# Patient Record
Sex: FEMALE | Race: WHITE | Employment: OTHER | URBAN - METROPOLITAN AREA
[De-identification: names, ages, dates, MRNs, and addresses within clinical notes are randomized per-mention and may not be internally consistent; named-entity substitution may affect disease eponyms.]

---

## 2021-01-15 ENCOUNTER — OFFICE VISIT (OUTPATIENT)
Dept: NEUROLOGY | Age: 46
End: 2021-01-15

## 2021-01-15 ENCOUNTER — DOCUMENTATION ONLY (OUTPATIENT)
Dept: NEUROLOGY | Age: 46
End: 2021-01-15

## 2021-01-15 VITALS
HEART RATE: 92 BPM | WEIGHT: 275 LBS | OXYGEN SATURATION: 97 % | RESPIRATION RATE: 18 BRPM | DIASTOLIC BLOOD PRESSURE: 100 MMHG | HEIGHT: 62 IN | SYSTOLIC BLOOD PRESSURE: 152 MMHG | TEMPERATURE: 97 F | BODY MASS INDEX: 50.61 KG/M2

## 2021-01-15 DIAGNOSIS — R20.9 SENSORY DISTURBANCE: ICD-10-CM

## 2021-01-15 DIAGNOSIS — R42 DIZZY: Primary | ICD-10-CM

## 2021-01-15 DIAGNOSIS — R29.818 TRANSIENT NEUROLOGICAL SYMPTOMS: ICD-10-CM

## 2021-01-15 DIAGNOSIS — R29.818 TRANSIENT NEUROLOGICAL SYMPTOMS: Primary | ICD-10-CM

## 2021-01-15 DIAGNOSIS — M79.605 LEFT LEG PAIN: ICD-10-CM

## 2021-01-15 PROCEDURE — 99204 OFFICE O/P NEW MOD 45 MIN: CPT | Performed by: PSYCHIATRY & NEUROLOGY

## 2021-01-15 RX ORDER — LORATADINE 10 MG/1
10 TABLET ORAL
COMMUNITY

## 2021-01-15 RX ORDER — LEVOTHYROXINE SODIUM 25 UG/1
TABLET ORAL
COMMUNITY
Start: 2020-12-13

## 2021-01-15 RX ORDER — SERTRALINE HYDROCHLORIDE 50 MG/1
TABLET, FILM COATED ORAL
COMMUNITY
Start: 2020-12-16

## 2021-01-15 NOTE — LETTER
1/15/2021 Patient: Judy Cee YOB: 1975 Date of Visit: 1/15/2021 Sanjana Lomax MD 
[address] [facility] [address] [place] [address] [phone] Via Fax: [phone] Dear Sanjana Lomax MD, Thank you for referring Ms. Agarwal Mail to [facility] for evaluation. My notes for this consultation are attached. If you have questions, please do not hesitate to call me. I look forward to following your patient along with you. Sincerely, Kassandra Saxena MD

## 2021-01-15 NOTE — PROGRESS NOTES
Chief Complaint   Patient presents with    New Patient    Leg Pain     left, pain is intermittent. has constant vibration feeling in back of leg      Pain when standing/walking for extended periods    Main sx in head is migrainous but not quite. Like a wave crashing in head.   All correlates with feeling in leg    Has only seen ENT because she thought it was vertigo sx

## 2021-01-15 NOTE — PROGRESS NOTES
[facility] AT [place]   Carotid Doppler Report    Date of Service: 1/15/21  Referring: Dr. Roberta Morris      B-mode imaging reveals mild intimal thickening at the bifurcation extending into the internal carotid artery segments bilaterally. Doppler spectral analysis reveals no elevated velocities. Vertebral artery flow antegrade bilaterally.     Interpretation  1-15% bilateral ICA    Jigna Pink MD

## 2021-01-15 NOTE — PROGRESS NOTES
[facility] and [address] at [facility] at [address] [place] [address], [address] [facility] [address] [address] [facility], [address] [place] [address]  [phone] Office  [phone] Facsimile           Referring: Leonard Jean MD      Chief Complaint   Patient presents with    New Patient    Leg Pain     left, pain is intermittent. has constant vibration feeling in back of leg      79-year-old right-handed lady who comes today for evaluation of what she calls suspected nerve issue. Her symptoms began at the end of September. She does note that like everyone its been in intense 2020 although she did lose her brother at the age of 29 unexpectedly and her grandmother passed away as well after having had dementia. In any regard she says she has a collection of symptoms that she believes are related. She started to have what she calls a vibration in the left lower extremity and some pain behind the knee. She has this rumbling feeling that is constant behind the knee and vibratory. Worsens if she bends her leg. She has a history of migraine and has maybe 4/year. She notes that if she bends her leg she will have increased pain in her neck and then has this wave go through her head and a crushing type sensation. She notes this crushing sensation then builds throughout her body. She has dizziness described as imbalance. No alejandra spinning. Symptoms increased when she lays down. No focal weakness. Her symptoms would awaken her from sleep and she wonders if she is bending her left leg when she sleeps. She did see ENT x2 and did not have any otolaryngology abnormality and there was not felt to have benign positional vertigo etc.  She does describe a Killeen-Hallpike type maneuver. She has not had any imaging.   She does note that she has had a visual disturbance in the past that was felt to perhaps be ocular migraine where she would see robert's. No injury. At the start of this she was having a lot of panic attacks and [facility]  She was diagnosed with hypothyroidism and she was started on levothyroxine. Her thyroid levels have since come back into the normal range. She was also having panic attack and started on Zoloft. Chart review finds no documentation in our system. No recent imaging in our system. No neurologic imaging in our system. Past Medical History:   Diagnosis Date    Anxiety     Hypothyroidism        Past Surgical History:   Procedure Laterality Date    HX CHOLECYSTECTOMY      HX WISDOM TEETH EXTRACTION              Not on File    Social History     Tobacco Use    Smoking status: Former Smoker    Smokeless tobacco: Never Used   Substance Use Topics    Alcohol use: Not Currently    Drug use: Not Currently       Family History   Problem Relation Age of Onset   Medina Migraines Mother     Hypertension Mother     No Known Problems Father     Dementia Maternal Grandmother        Review of Systems  Pertinent positives and negatives as noted with remainder of comprehensive review negative      Examination  Visit Vitals  Temp 97 °F (36.1 °C)     Neurologically, she is awake, alert, and oriented with normal speech and language. Her cognition is normal.    Intact cranial nerves 2-12. No nystagmus. Visual fields full to confrontation. Disk margins are flat bilaterally. She has normal bulk and tone. She has no abnormal movement. She has no pronation or drift. She generates full strength in the upper and lower extremities to direct confrontational testing. Reflexes are symmetrical in the upper and lower extremities bilaterally. No pathologic reflexes are elicited. .  Finger nose finger and rapid alternating movements are normal.  Steady gait. No sensory deficit to primary modalities. Dress and grooming are appropriate. No scleral icterus is present. Oropharynx is clear.   Supple neck without bruit appreciated. Heart regular. Pulses are symmetrical.  No edema in the lower extremities. Impression/Plan  Left lower extremity sensory disturbance and pain  Dizziness  Visual disturbance  Abnormal sensations    Differential diagnosis certainly includes demyelinating disease versus space-occupying versus vascular versus supratentorial versus other  MRI of the brain  EEG  Carotid Doppler  EMG of the left lower extremity  Evoked potentials    Follow-up after testing    Pearson Peabody, MD        This note was created using voice recognition software. Despite editing, there may be syntax errors.

## 2021-01-15 NOTE — PATIENT INSTRUCTIONS
RESULT POLICY If we have ordered testing for you, know that; \"NO NEWS IS GOOD NEWS! \" It is our policy that we no longer call patients with results, nor do we  give test results over the phone. We schedule follow up appointments so that your results can be discussed in person. This allows you to address any questions you have regarding the results. If you choose to go to an imaging center outside of [facility], it is your responsibility to bring imaging report and disc to follow up appointment. If something of concern is revealed on your test, we will contact you to discuss the matter and if needed schedule a sooner follow up appointment. Additionally, results may be found by using the My Chart feature and one of our patient service representatives at the  can give you instructions on how to access this feature to utilize our electronic medical record system. Thank you for your understanding. PRESCRIPTION REFILL POLICY [facility] Statement to Patients April 1, 2014 In an effort to ensure the large volume of patient prescription refills is processed in the most efficient and expeditious manner, we are asking our patients to assist us by calling your Pharmacy for all prescription refills, this will include also your  Mail Order Pharmacy. The pharmacy will contact our office electronically to continue the refill process. Please do not wait until the last minute to call your pharmacy. We need at least 48 hours (2days) to fill prescriptions. We also encourage you to call your pharmacy before going to  your prescription to make sure it is ready. With regard to controlled substance prescription refill requests (narcotic refills) that need to be picked up at our office, we ask your cooperation by providing us with at least 72 hours (3days) notice that you will need a refill. We will not refill narcotic prescription refill requests after 4:00pm on any weekday, Monday through Thursday, or after 2:00pm on Fridays, or on the weekends. We encourage everyone to explore another way of getting your prescription refill request processed using [employer], our patient web portal through our electronic medical record system. [employer]ay is an efficient and effective way to communicate your medication request directly to the office and  downloadable as an schuyler on your smart phone . [employer] also features a review functionality that allows you to view your medication list as well as leave messages for your physician. Are you ready to get connected? If so please review the attatched instructions or speak to any of our staff to get you set up right away! Thank you so much for your cooperation. Should you have any questions please contact our Practice Administrator.

## 2021-01-15 NOTE — PROCEDURES
EEG:      Date:  01/15/21    Requesting Physician:  Bruno Ralph. MD [place]    An EEG is requested in this 41year old lady with transient neurologic symptoms to evaluate for epileptiform abnormality. Medications:  Medications are listed as Synthroid and Zoloft. This tracing is obtained during the awake state. During wakefulness there are intermittent runs of posteriorly-dominant and symmetric low-to-medium amplitude 11-11.5 cycle per second activities which attenuate with eye opening. Lower-voltage faster-frequency activities are seen symmetrically over the anterior head regions. Hyperventilation is not performed secondary to COVID-19 precautions. Intermittent photic stimulation little alters the tracing. Sleep is not attained. Interpretation:   This EEG recorded during the awake state is normal.

## 2021-02-02 ENCOUNTER — OFFICE VISIT (OUTPATIENT)
Dept: NEUROLOGY | Age: 46
End: 2021-02-02

## 2021-02-02 VITALS — TEMPERATURE: 97.2 F

## 2021-02-02 DIAGNOSIS — M54.17 LUMBOSACRAL RADICULOPATHY: ICD-10-CM

## 2021-02-02 DIAGNOSIS — R29.818 TRANSIENT NEUROLOGICAL SYMPTOMS: Primary | ICD-10-CM

## 2021-02-02 DIAGNOSIS — G62.9 NERVE DISEASE, PERIPHERAL: Primary | ICD-10-CM

## 2021-02-02 DIAGNOSIS — R42 DIZZY: Primary | ICD-10-CM

## 2021-02-02 NOTE — PROCEDURES
[facility] AT [place]   Brainstem Auditory Evoked Potential Report    Date of Service: 2/2/21  Referring: Dr. Trista Mazariegos  Indication: Dizzy    Bilateral brainstem auditory evoked potential evoked potential is performed. Waveforms are appropriate for interpretation. Absolute latency of the waveforms are  normal bilaterally within technical limitations.     Normal Study    Oscar Jesus MD

## 2021-02-02 NOTE — PROGRESS NOTES
This was an elective EMG and nerve conduction of the patient's left lower extremity by request.  Concerns went to an acquired peripheral neuropathy versus lumbosacral radiculopathy. Patient history. Left lower extremity sensory disturbance and pain. Patient said she would get infrequent lower back discomfort. Patient exam.  Alert cooperative articulate and appropriate. Cranial nerves II through XII normal.  Cerebellar testing finger-nose-finger toe finger tap. Motor 5/5. Sensory intact to touch temperature and vibratory. Reflexes approaching +2 gait and transfers normal.     EMG and nerve conduction findings. 1.  Needle insertion and probing was uniformly normal.  There was no evidence of acute denervation chronic denervation/reinnervation or myopathic potentials. Patient tolerated this procedure well although some muscles were more painful than others. Motor unit recruitment as to number morphology and time sequencing all appropriate. The investigation included upper through lower left-sided lumbosacral paraspinals and unrevealing. 2.  The nerve conduction study was normal except for the nerve conduction velocity discrepancy for the left peroneal nerve at the fibular head. However downstream response in the form of amplitude and morphology was maintained. The coincident tibial F wave and H reflex were normal.    Impression: This is a normal EMG and nerve conduction including paraspinals. The study does not rule out the small fiber sensory neuropathy.   ANUP VILLEGAS.

## 2021-02-02 NOTE — PROCEDURES
[facility] AT [place]   Visual Evoked Potential Report    Date of Service: 2/2/21  Referring: Dr. Corinne Holman  Indication: Transient neurologic symptoms    Bilateral full field pattern reversal visual evoked potential is performed. Waveforms are appropriate for interpretation. Absolute latency of the P100 is normal bilaterally.     Normal Study    Susan Ivy MD

## 2021-02-02 NOTE — PROGRESS NOTES
EMG/ NCS Report  [facility] - INPATIENT  [address] [facility] [address] [place], [address]   Ph: [phone]   FAX: [phone]/ [phone]  Test Date:  2021    Patient: Zohra Angulo : 1975 Physician: Selma Scott   Sex: Female Height:  cm Ref Phys: Jack Villar MD   ID#: [id number] Weight:  lbs. Technician: Reema Burton     Patient Complaints:  CC:DIZZY    Medications      Patient History / Exam:      EMG & NCV Findings:  Impression:      Recommendations:          ___________________________  Selma Scott        VEP     Trace N75 (ms) P100 (ms) N145 (ms) U20-C985 (µV)   Norm  <117     *Ch2 : 01-CZ : L 69.9 94.9 130.1 6.78   *Ch2 : 02-CZ : R 70.3 94.9 136.7 5.20   L-R Norm  <7     L-R 0.4 0.0 6.6 1.58   *A 88 millisecond delay was used for the monitor.            Waveforms:

## 2021-02-02 NOTE — PROGRESS NOTES
EMG/ NCS Report  [facility] - INPATIENT  [address] [place], [address]   Ph: [phone]   FAX: [phone]/ [phone]  Test Date:  2019      Test Date:  2021    Patient: Cecilia Lott : 1975 Physician: Angel Maldonado MD   Sex: Female Height: ' \" Ref Phys: Joe Goldsmith IV, MD   ID#: [id number] Weight:  lbs. Technician: Esau Centeno     Patient History / Exam:  CC:LT. LEG PAIN WITH FEELING OF SENSRY ISSUES. SYMPTOMS SINCE SEPT. EMG & NCV Findings:  Evaluation of the left Fibular motor nerve showed normal distal onset latency (3.8 ms), normal amplitude (6.3 mV), normal conduction velocity (B Fib-Ankle, 47 m/s), and normal conduction velocity (Poplt-B Fib, 63 m/s). The left tibial motor nerve showed normal distal onset latency (3.8 ms), normal amplitude (10.5 mV), and normal conduction velocity (Knee-Ankle, 50 m/s). The left Sup Fibular sensory nerve showed normal distal peak latency (2.4 ms), normal amplitude (18.0 µV), and normal conduction velocity (Lower leg-Lat ankle, 56 m/s). The left sural sensory nerve showed normal distal peak latency (3.3 ms) and normal amplitude (9.3 µV). All F Wave latencies were within normal limits. All examined muscles (as indicated in the following table) showed no evidence of electrical instability.         Impression:        ___________________________  Joe Goldsmith IV, MD      Nerve Conduction Studies  Anti Sensory Summary Table     Stim Site NR Peak (ms) Norm Peak (ms) P-T Amp (µV) Norm P-T Amp Site1 Site2 Dist (cm)   Left Sup Fibular Anti Sensory (Lat ankle)  27.3°C   Lower leg    2.4 <4.5 18.0 >5 Lower leg Lat ankle 10.0   Site 2    2.4  18.4           2.4  17.1       Left Sural Anti Sensory (Lat Mall)  27.2°C   Calf    3.3 <4.5 9.3 >4.0 Calf Lat Mall 14.0   Site 2    3.2  12.2       Site 3    3.3  6.4         Motor Summary Table     Stim Site NR Onset (ms) Norm Onset (ms) O-P Amp (mV) Norm O-P Amp Amp (Prev) (%) Site1 Site2 Dist (cm) Nate (m/s) Norm Nate (m/s)   Left Fibular Motor (Ext Dig Brev)  27.2°C   Ankle    3.8 <6.5 6.3 >2.6 100.0 Ankle Ext Dig Brev 8.0     B Fib    9.8  6.0  95.2 B Fib Ankle 28.0 47 >38   Poplt    11.4  5.8  96.7 Poplt B Fib 10.0 63 >42   Left Tibial Motor (Abd Simon Brev)  27.1°C   Ankle    3.8 <6.1 10.5 >5.3 100.0 Ankle Abd Simon Brev 8.0     Knee    10.2  8.7  82.9 Knee Ankle 32.0 50 >39     F Wave Studies     NR F-Lat (ms) Lat Norm (ms) L-R F-Lat (ms) L-R Lat Norm   Left Tibial (Mrkrs) (Abd Hallucis)  27.1°C      42.93 <56  <5.7     H Reflex Studies     NR H-Lat (ms) L-R H-Lat (ms) L-R Lat Norm   Left Tibial (Gastroc)  27°C      28.94  <2.0     EMG     Side Muscle Nerve Root Ins Act Fibs Psw Recrt Duration Amp Poly Comment   Left Ext Dig Brev Dp Br Peron L5, S1 Nml Nml Nml Nml Nml Nml Nml    Left AntTibialis Dp Br Peron L4-5 Nml Nml Nml Nml Nml Nml Nml    Left MedGastroc Tibial S1-2 Nml Nml Nml Nml Nml Nml Nml    Left VastusMed Femoral L2-4 Nml Nml Nml Nml Nml Nml Nml    Left BicepsFemL Sciatic L5-S2 Nml Nml Nml Nml Nml Nml Nml    Left Mid Lumb Parasp Rami L4,5 Nml Nml Nml Nml Nml Nml Nml    Left Upper Lumb Parasp Rami L3,4 Nml Nml Nml Nml Nml Nml Nml    Left Lower Lumb Parasp Rami L5,S1 Nml Nml Nml Nml Nml Nml Nml                Nerve Conduction Studies  Anti Sensory Left/Right Comparison     Stim Site L Lat (ms) R Lat (ms) L-R Lat (ms) L Amp (µV) R Amp (µV) L-R Amp (%) Site1 Site2 L Nate (m/s) R Nate (m/s) L-R Nate (m/s)   Sup Fibular Anti Sensory (Lat ankle)  27.3°C   Lower leg 1.8   18.0   Lower leg Lat ankle 56     Site 2 1.9   18.4           1.8   17.1          Sural Anti Sensory (Lat Mall)  27.2°C   Calf 2.6   9.3   Calf Lat Mall 54     Site 2 2.6   12.2          Site 3 2.7   6.4            Motor Left/Right Comparison     Stim Site L Lat (ms) R Lat (ms) L-R Lat (ms) L Amp (mV) R Amp (mV) L-R Amp (%) Site1 Site2 L Nate (m/s) R Nate (m/s) L-R Nate (m/s)   Fibular Motor (Ext Dig Brev)  27.2°C   Ankle 3.8   6.3   Ankle Ext Dig Brev      B Fib 9.8   6.0   B Fib Ankle 47     Poplt 11.4   5.8   Poplt B Fib 63     Tibial Motor (Abd Simon Brev)  27.1°C   Ankle 3.8   10.5   Ankle Abd Simon Brev      Knee 10.2   8.7   Knee Ankle 50           Waveforms:

## 2021-02-02 NOTE — PROGRESS NOTES
EMG/ NCS Report  [facility] - INPATIENT  [address] [place], [address]   Ph: [phone]   FAX: [phone]/ [phone]  Test Date:  2021    Patient: Jake Nina : 1975 Physician: Sam Curtis MD   Sex: Female Height:  cm Ref Richie Quintana MD   ID#: [id number] Weight:  lbs. Technician: Mira Gan     Patient Complaints:  CC:DIZZY,PT. C/O OF VIBRATION SENSATION IN THE LT .LEG DURING TESTING.     Medications      Patient History / Exam:      EMG & NCV Findings:  Impression:      Recommendations:          ___________________________  KENYA SENIOR MD        AEP     Trace I (ms) II (ms) III (ms) IV (ms) V (ms) I-III (ms) III-V (ms) I-V (ms) V-Va (µV) I-Ia (µV) V-Va/I-Ia   Norm <2.0  <4.5  <6.2 <2.4 <2.3 <4.5      Ch1 : A1-Cz : L 1.41 2.45 4.28 4.81 5.98 2.47 1.70 4.57 0.46 0.00 0.00   Ch1 : A1-Cz : R 1.41 2.45 4.28 4.72 6.08 2.47 1.80 4.67 0.36 0.10 3.60   L-R Norm      <0.28 <0.32 <0.33      L-R 0.00 0.00 0.00 0.09 0.10 0.00 0.10 0.10 0.10 0.10 3.60           Waveforms:

## 2021-02-05 ENCOUNTER — HOSPITAL ENCOUNTER (OUTPATIENT)
Dept: MRI IMAGING | Age: 46
Discharge: HOME | End: 2021-02-05
Attending: PSYCHIATRY & NEUROLOGY
Payer: COMMERCIAL

## 2021-02-05 DIAGNOSIS — R42 DIZZY: ICD-10-CM

## 2021-02-05 DIAGNOSIS — R20.9 SENSORY DISTURBANCE: ICD-10-CM

## 2021-02-05 DIAGNOSIS — R29.818 TRANSIENT NEUROLOGICAL SYMPTOMS: ICD-10-CM

## 2021-02-05 DIAGNOSIS — M79.605 LEFT LEG PAIN: ICD-10-CM

## 2021-02-05 PROCEDURE — 74011250636 HC RX REV CODE- 250/636: Performed by: PSYCHIATRY & NEUROLOGY

## 2021-02-05 PROCEDURE — 70553 MRI BRAIN STEM W/O & W/DYE: CPT

## 2021-02-05 PROCEDURE — A9575 INJ GADOTERATE MEGLUMI 0.1ML: HCPCS | Performed by: PSYCHIATRY & NEUROLOGY

## 2021-02-05 RX ORDER — GADOTERATE MEGLUMINE 376.9 MG/ML
20 INJECTION INTRAVENOUS
Status: COMPLETED | OUTPATIENT
Start: 2021-02-05 | End: 2021-02-05

## 2021-02-05 RX ADMIN — GADOTERATE MEGLUMINE 20 ML: 376.9 INJECTION INTRAVENOUS at 11:59

## 2021-02-16 ENCOUNTER — OFFICE VISIT (OUTPATIENT)
Dept: NEUROLOGY | Age: 46
End: 2021-02-16
Payer: COMMERCIAL

## 2021-02-16 VITALS
DIASTOLIC BLOOD PRESSURE: 76 MMHG | BODY MASS INDEX: 47.48 KG/M2 | TEMPERATURE: 97.5 F | SYSTOLIC BLOOD PRESSURE: 138 MMHG | RESPIRATION RATE: 18 BRPM | OXYGEN SATURATION: 97 % | HEART RATE: 92 BPM | HEIGHT: 62 IN | WEIGHT: 258 LBS

## 2021-02-16 DIAGNOSIS — R42 DIZZY: ICD-10-CM

## 2021-02-16 DIAGNOSIS — D47.09 MAST CELL DISORDER: ICD-10-CM

## 2021-02-16 DIAGNOSIS — R20.9 SENSORY DISTURBANCE: ICD-10-CM

## 2021-02-16 DIAGNOSIS — R93.0 ABNORMAL MRI OF HEAD: Primary | ICD-10-CM

## 2021-02-16 DIAGNOSIS — R29.818 TRANSIENT NEUROLOGICAL SYMPTOMS: ICD-10-CM

## 2021-02-16 DIAGNOSIS — L43.8 ORAL LICHEN PLANUS: ICD-10-CM

## 2021-02-16 PROCEDURE — 99214 OFFICE O/P EST MOD 30 MIN: CPT | Performed by: NURSE PRACTITIONER

## 2021-02-16 NOTE — PROGRESS NOTES
[facility],[address]  [address]   [place] [address] [place] [address] [address]   [phone] Office   [phone] Fax           Date:  21     Name:  Peter Postal  :  1975  MRN:  [id number]     PCP:  Belle Pastor MD    Chief Complaint   Patient presents with    Results       HISTORY OF PRESENT ILLNESS: Follow up for multiple neurological complaints after testing. When she was initially seen by Dr. Britney Mahajan, she had significant complaints of left lower extremity sensory disturbance and pain, dizziness, visual disturbances, and abnormal sensations. These issues have been going on for some time and she indicates that her mother also has some issues that are similar. For further evaluation, she was sent for an MRI of the brain, EEG, carotid Doppler, EMG of the left lower extremity, and visual evoked potentials. All of her testing was essentially normal.  MRI of the brain demonstrated only 1 area of foci which is nonspecific in isolation. Current Outpatient Medications   Medication Sig    levothyroxine (SYNTHROID) 25 mcg tablet TAKE 1 TABLET BY MOUTH EVERY DAY    sertraline (ZOLOFT) 50 mg tablet TAKE 1 TABLET BY MOUTH EVERY DAY    loratadine (Claritin) 10 mg tablet Take 10 mg by mouth. No current facility-administered medications for this visit.       Not on File  Past Medical History:   Diagnosis Date    Anxiety     Hypothyroidism      Past Surgical History:   Procedure Laterality Date    HX CHOLECYSTECTOMY      HX WISDOM TEETH EXTRACTION       Social History     Socioeconomic History    Marital status:      Spouse name: Not on file    Number of children: Not on file    Years of education: Not on file    Highest education level: Not on file   Occupational History    Not on file   Social Needs    Financial resource strain: Not on file    Food insecurity     Worry: Not on file Inability: Not on file    Transportation needs     Medical: Not on file     Non-medical: Not on file   Tobacco Use    Smoking status: Former Smoker    Smokeless tobacco: Never Used   Substance and Sexual Activity    Alcohol use: Not Currently    Drug use: Not Currently    Sexual activity: Not on file   Lifestyle    Physical activity     Days per week: Not on file     Minutes per session: Not on file    Stress: Not on file   Relationships    Social connections     Talks on phone: Not on file     Gets together: Not on file     Attends Latter-day service: Not on file     Active member of club or organization: Not on file     Attends meetings of clubs or organizations: Not on file     Relationship status: Not on file    Intimate partner violence     Fear of current or ex partner: Not on file     Emotionally abused: Not on file     Physically abused: Not on file     Forced sexual activity: Not on file   Other Topics Concern    Not on file   Social History Narrative    Not on file     Family History   Problem Relation Age of Onset    Migraines Mother     Hypertension Mother     No Known Problems Father     Dementia Maternal Grandmother        PHYSICAL EXAMINATION:    Visit Vitals  /76   Pulse 92   Temp 97.5 °F (36.4 °C)   Resp 18   Ht 5' 2\" (1.575 m)   Wt 117 kg (258 lb)   SpO2 97%   BMI 47.19 kg/m²     General:  Well defined, nourished, and groomed individual in no acute distress. Neck: Supple, nontender, no bruits, no pain with resistance to active range of motion. Heart: Regular rate and rhythm, no murmurs, rub, or gallop. Normal S1S2. Lungs:  Clear to auscultation bilaterally with equal chest expansion, no cough, no wheeze  Musculoskeletal:  Extremities revealed no edema and had full range of motion of joints. Psych:  Good mood and bright affect    NEUROLOGICAL EXAMINATION:     Mental Status:   Alert and oriented to person, place, and time with recent and remote memory intact. Attention span and concentration are normal. Speech is fluent with a full fund of knowledge. Cranial Nerves:  I: smell Not tested   II: visual fields Full to confrontation   II: pupils Equal, round, reactive to light   II: optic disc No papilledema   III,VII: ptosis none   III,IV,VI: extraocular muscles  Full ROM   V: mastication normal   V: facial light touch sensation  normal   VII: facial muscle function   symmetric   VIII: hearing symmetric   IX: soft palate elevation  normal   XI: trapezius strength  5/5   XI: sternocleidomastoid strength 5/5   XI: neck flexion strength  5/5   XII: tongue  midline     Motor Examination: Normal tone, bulk, and strength, 5/5 muscle strength throughout. Sensory exam:  Normal throughout to temperature. Coordination:  Finger to nose and rapid arm movement testing was normal.   No resting or intention tremor    Gait and Station:  Steady while walking. Normal arm swing. No pronator drift. No muscle wasting or fasiculations noted. Reflexes:  DTRs 2+ throughout. ASSESSMENT AND PLAN    ICD-10-CM ICD-9-CM    1. Abnormal MRI of head  R93.0 793.0 MRI BRAIN W WO CONT   2. Sensory disturbance  R20.9 782.0 REFERRAL TO NEUROLOGY      REFERRAL TO RHEUMATOLOGY   3. Transient neurological symptoms  R29.818 781.99 REFERRAL TO NEUROLOGY      REFERRAL TO RHEUMATOLOGY   4. 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 Oral lichen planus  E68.9 697.0 REFERRAL TO RHEUMATOLOGY     At this point, Ms. Rafia Pedraza continues to have transient neurologic symptoms, sensory disturbance, and dizziness. From a neurologic standpoint, her exam is normal as is the vast majority of her work-up. She did have an MRI with 1 area of abnormal enhancement which is very nonspecific.   It would not be in keeping with a diagnosis of multiple sclerosis despite her ongoing issues with periodic flareups which are associated with increased sensory disturbance, dizziness, fatigue, weakness, etc.  I would think that at this point with her history, if it was a demyelinating process, that she would have more than 1 area of abnormal enhancement. Due to its presence, I will repeat the MRI in about 6 months and if at that time she does have additional areas of enhancement then perhaps additional work-up for a possible demyelinating cause would be warranted. In the meantime, we will set her up for autonomic nervous system testing given her complaints of sensory disturbance and dizziness. She mentions today that she does have a diagnosis of autoimmune disorder, specifically mast cell and oral lichen planus. She has never been evaluated by rheumatology for more specific autoimmune disorders that may explain better her transient neurologic symptoms. She was referred to rheumatology today  [facility].  Nohemi Bhatia

## 2021-02-17 ENCOUNTER — TELEPHONE (OUTPATIENT)
Dept: NEUROLOGY | Age: 46
End: 2021-02-17

## 2021-02-17 NOTE — TELEPHONE ENCOUNTER
Thank you for your online Notification/Prior Authorization submission. The notification/prior authorization case information was transmitted on 02/17/2021 at 12:57 PM CST. The notification/prior authorization reference number is [id number]. Please print this page for your records. The reference number above acknowledges receipt of your notification or prior authorization request. Please write this number down and refer to it for future inquiries. Coverage and payment for an item or service is governed by the member's benefit plan document, and, if applicable, the provider's participation agreement with the Health Plan.

## 2021-02-25 NOTE — TELEPHONE ENCOUNTER
----- Message from Leisa Maradiaga Page sent at 2/25/2021  1:49 PM EST -----  Regarding: PAOLA Lafleur/ Telephone  Patient return call    Caller's first and last name and relationship (if not the patient): n/a      Best contact number(s): [phone]      Whose call is being returned: August Alicea      Details to clarify the request: Returning call to the office      Brandee Colunga

## 2021-02-25 NOTE — TELEPHONE ENCOUNTER
Spoke with patient to schedule she would like to talk with md prior to stopping meds for 5 days she will call back

## 2021-07-08 ENCOUNTER — TELEPHONE (OUTPATIENT)
Dept: RHEUMATOLOGY | Age: 46
End: 2021-07-08

## 2021-07-09 ENCOUNTER — OFFICE VISIT (OUTPATIENT)
Dept: RHEUMATOLOGY | Age: 46
End: 2021-07-09
Payer: COMMERCIAL

## 2021-07-09 VITALS
HEIGHT: 62 IN | DIASTOLIC BLOOD PRESSURE: 76 MMHG | HEART RATE: 112 BPM | BODY MASS INDEX: 48.4 KG/M2 | TEMPERATURE: 98.1 F | WEIGHT: 263 LBS | RESPIRATION RATE: 18 BRPM | SYSTOLIC BLOOD PRESSURE: 132 MMHG

## 2021-07-09 DIAGNOSIS — L71.9 ROSACEA: ICD-10-CM

## 2021-07-09 DIAGNOSIS — M25.572 CHRONIC PAIN OF BOTH ANKLES: ICD-10-CM

## 2021-07-09 DIAGNOSIS — M25.551 HIP PAIN, BILATERAL: Primary | ICD-10-CM

## 2021-07-09 DIAGNOSIS — M25.552 HIP PAIN, BILATERAL: Primary | ICD-10-CM

## 2021-07-09 DIAGNOSIS — G89.29 CHRONIC PAIN OF BOTH ANKLES: ICD-10-CM

## 2021-07-09 DIAGNOSIS — M25.571 CHRONIC PAIN OF BOTH ANKLES: ICD-10-CM

## 2021-07-09 PROCEDURE — 99205 OFFICE O/P NEW HI 60 MIN: CPT | Performed by: INTERNAL MEDICINE

## 2021-07-09 NOTE — LETTER
7/9/2021    Patient: Feliz Tirpathi   YOB: 1975   Date of Visit: 7/9/2021     Amy Mcqueen MD  [address]  [address]  [place]ia [place]ax: [facility] [place] [address]  [address] [place] [phone]  [place] [place]    Dear MD Logan Koenig NP,      Thank you for referring Ms. Alyssa Arechiga to [address] [place] [address] for evaluation. My notes for this consultation are attached. If you have questions, please do not hesitate to call me. I look forward to following your patient along with you.       Sincerely,    Shanda Blue MD

## 2021-07-09 NOTE — PROGRESS NOTES
REASON FOR VISIT    This is the initial evaluation for Ms. Mery Beth a 41 y.o. WHITE/NON- female for question of a connective tissue disease. The patient is referred to the [facility] at the request of Shelbi Chiu NP.     HISTORY OF PRESENT ILLNESS     I have reviewed and summarized old records from [address] [place] [address] and [facility]    She has a history of Hashimoto's thyroiditis and oral lichen planus (unbiopsied). In 2/05/2021, MRI Brain with and without contrast showed Ventricles:  Midline, no hydrocephalus. Brain Parenchyma/Brainstem:  Solitary small focus of FLAIR/T2 hyperintensity left frontal lobe white matter. No acute infarction. Intracranial Hemorrhage:  None. Basal Cisterns:  Normal. Flow Voids:  Normal. Post Contrast:  No abnormal parenchymal or meningeal enhancement. In 2/16/2021, she followed up with neurology, Shelbi Chiu NP. Muscle and sensory exam were normal.     In 4/13/2021, labs showed positive anti-TPO 44 (0-34), negative antithyroglobulin, normal FT3 2.1 (2.0-4.4). Today, she complains of flushing, joint and muscle pain, migraine, vestibular migraine, dizziness, vertigo, brain fog, fatigue. She reports having a 7 years history slightly raised red flushing on her cheeks, ears and face that appears as stripes and around her neck that comes randomly. Heat ad fluorescent lighting are triggers lasting up to several hours. She has notes that it may linger. It can happen several times a day. She has not seen a dermatologist for this condition She saw her PCP who thinks this is due to Mast Cell. She also complains of pain in her wrists, outer hips, ankles. Her hip and ankle pains worse and her pain is aching and present in the morning and worsens throughout the day and worse at night night, especially at night. She has not done physical therapy for her outer hips.  Her right wrist pain is burning and aching and occurs as the day goes on but not present in the morning. She has diffuse stiffness lasting an hour. APAP does not help. Advil 800 mg helps little by taking the edge off. These symptoms come and go lasting up to 2 months and then resolving. on its own for several months. No exacerbating factors. She has been having these issues for more than 10 years. She has gained 40 lbs over the past 6 months. She has avoid night shade produce which helped her lichen planus. She has a history of costochondritis in her youth. Therapy History includes:  Current DMARD therapy include: None  Prior DMARD therapy include: None  Discontinued DMARDs because of inefficacy: None  Discontinued DMARDs because of side effects: None    REVIEW OF SYSTEMS    A 15 point review of systems was performed and summarized below. The questionnaire was reviewed with the patient and scanned into the patient's medical record.     General: endorses recent 40 lbs weight gain over 6 months, fatigue, denies recent weight loss, weakness, fever, drenching night sweats  Musculoskeletal: endorses joint pain, joint swelling, morning stiffness (lasting 60 minutes), muscle pain  Ears: denies ringing in ears, hearing loss, deafness  Eyes: endorses foreign body sensation, denies pain, light sensitive, redness, blindness, double vision, blurred vision, excess tearing, dryness  Mouth: endorses oral ulcers due to oral lichen planus, denies sore tongue, loss of taste, dryness, increased dental caries  Nose: denies nosebleeds, nasal ulcers  Throat: denies food stuck when swallowing, difficulty with swallowing, hoarseness, pain in jaw while chewing  Neck: endorses swollen glands, tender glands  Cardiopulmonary: endorses dry cough, shortness of breath on exertion, denies pain in chest with deep breaths, pain in chest when lying down, murmurs, sudden changes in heart beat, wheezing, productive cough, shortness of breath at rest, coughing of blood  Gastrointestinal: endorses nausea, heartburn, denies stomach pain relieved by food, chronic constipation, chronic diarrhea, blood in stools, black stools  Genitourinary: denies vaginal dryness, pain or burning on urination, blood in urine, cloudy urine, vaginal ulcers  Hematologic: denies anemia, bleeding tendency, blood clots, bleeding gums  Skin: endorses rash, denies easy bruising, hair loss, rash worsened after sun exposure, hives/urticaria, skin thickening, skin tightness, nodules/bumps, color changes of hands or feet in the cold (Raynaud's)  Neurologic: endorses numbness or tingling in hands, numbness or tingling in feet, denies muscle weakness  Psychiatric: endorses depression, excessive worries, denies PTSD, Bipolar  Sleep: endorses poor sleep (7-8 hours), obstructive sleep apnea on CPAP, daytime somnolence, difficulty falling asleep, difficulty staying asleep, denies snoring    PAST MEDICAL HISTORY    She has a past medical history of Anxiety, Depression, Hypothyroidism, and Migraines. FAMILY HISTORY    Her family history includes Dementia in her maternal grandmother; Diabetes in her mother; Hypertension in her mother; Migraines in her mother; No Known Problems in her father; Other in her mother. SOCIAL HISTORY    She reports that she has quit smoking. She has never used smokeless tobacco. She reports previous alcohol use. She reports previous drug use. MEDICATIONS    Current Outpatient Medications   Medication Sig    levothyroxine (SYNTHROID) 25 mcg tablet TAKE 1 TABLET BY MOUTH EVERY DAY    sertraline (ZOLOFT) 50 mg tablet TAKE 1 TABLET BY MOUTH EVERY DAY    loratadine (Claritin) 10 mg tablet Take 10 mg by mouth. No current facility-administered medications for this visit. ALLERGIES    No Known Allergies    PHYSICAL EXAMINATION    Visit Vitals  /76   Pulse (!) 112   Temp 98.1 °F (36.7 °C)   Resp 18   Ht 5' 2\" (1.575 m)   Wt 263 lb (119.3 kg)   BMI 48.10 kg/m²     Body mass index is 48.1 kg/m².     General: Patient is alert, oriented x 3, not in acute distress    HEENT:   Conjunctiva are not injected and appear moist, oral mucous membranes are moist, there are no ulcers present, neck is supple, there is no lymphadenopathy  No lichen planus lesions    Cardiovascular:  Heart is regular rate and rhythm, no murmurs. Chest:  Lungs are clear to auscultation bilaterally. Extremities:  Free of clubbing, cyanosis, edema, extremities well perfused. Neurological exam:  Muscle strength is full in upper and lower extremities. Skin exam:  There are no active Raynaud's, no livedo reticularis, no periungual erythema, no alopecia. Rosacea (flat erythema) on checks, nose and neck without acne    Musculoskeletal exam:  A comprehensive musculoskeletal exam was performed for all joints of each upper and lower extremity and assessed for swelling, tenderness and range of motion. Pertinent results are documented as below:    Tenderness along lateral epicondyle, arms, sternocleidomastoid muscles  No tenderness along the trochanteric bursa  No iliotibial band tenderness  No synovitis. Hyperextension of knees  No ankle tenderness  Partial collapse of arches    No flowsheet data found. DATA REVIEW    Studies Reviewed:     Prior medical records were reviewed and are summarized as below:    Laboratory data: summarized in the HPI    Imaging: summarized in the HPI. ASSESSMENT AND PLAN    1) Rosacea. This is not an autoimmune rash. This is not a lupus rash. 2) Gluteal Hip Pain. I referred her to physical therapy. 3) Bilateral Ankle Pain. This is like tendinosis due to losing arch. I referred her to physical therapy. 4) Hashimoto's Thyroiditis and Oral Lichen Planus. There may be a relation. (Dalton M, Audi L, Deborah E, Jose L T. Association of oral lichen planus with thyroid disease in a [place] population: a retrospective case-control study. Oral Surg Oral Med Oral Pathol Oral Radiol Endod. 2010 Sep;110(3):319-24. doi: 10.1016/j.tripleo. 2010.04.001. Epub 2010 Jul 24. PMID: 89680869.)    The patient voiced understanding of the aforementioned assessment and plan. Summary of plan was provided in the After Visit Summary patient instructions. I also provided education about MyChart setup and utility. A total of 62 minutes was spent on this visit, reviewing interval notes, interval testing results, ordering tests, refilling medications, documenting the findings in the note, patient education, counseling, and coordination of care as described above. All questions asked and answered.     TODAY'S ORDERS    Orders Placed This Encounter    REFERRAL TO PHYSICAL THERAPY     Future Appointments   Date Time Provider Kip Galindo   8/17/2021 10:30 AM [facility] MRI [address] [place], [facility]    Adult Rheumatology   Rheumatology Ultrasound Certified  [address] [address]  [facility], [address] [facility], [address] [place] [address]   Phone [phone]  Fax [phone]    Patient Care Team:  Lenora Krishnamurthy MD as PCP - General (Family Medicine)

## 2023-05-20 RX ORDER — LORATADINE 10 MG/1
10 TABLET ORAL
COMMUNITY

## 2023-05-20 RX ORDER — LEVOTHYROXINE SODIUM 0.03 MG/1
1 TABLET ORAL DAILY
COMMUNITY
Start: 2020-12-13